# Patient Record
Sex: FEMALE | URBAN - METROPOLITAN AREA
[De-identification: names, ages, dates, MRNs, and addresses within clinical notes are randomized per-mention and may not be internally consistent; named-entity substitution may affect disease eponyms.]

---

## 2024-09-04 ENCOUNTER — TELEPHONE (OUTPATIENT)
Age: 29
End: 2024-09-04

## 2024-09-04 NOTE — TELEPHONE ENCOUNTER
PSR called the patient and informed they have been scheduled and also placed on the wait list.    Patient was thankful for the call.

## 2024-09-04 NOTE — TELEPHONE ENCOUNTER
PSR spoke with representative from [facility] who asked for the patient to be scheduled to see a neurologist.    PSR created a chart for the patient and scheduled them.    [facility] Representative was thankful and asked for the PSR to call the patient and inform them of their appointment.

## 2024-09-27 ENCOUNTER — TELEPHONE (OUTPATIENT)
Age: 29
End: 2024-09-27